# Patient Record
Sex: FEMALE | Race: WHITE
[De-identification: names, ages, dates, MRNs, and addresses within clinical notes are randomized per-mention and may not be internally consistent; named-entity substitution may affect disease eponyms.]

---

## 2017-01-09 ENCOUNTER — HOSPITAL ENCOUNTER (EMERGENCY)
Dept: HOSPITAL 25 - ED | Age: 52
Discharge: HOME | End: 2017-01-09
Payer: COMMERCIAL

## 2017-01-09 VITALS — SYSTOLIC BLOOD PRESSURE: 169 MMHG | DIASTOLIC BLOOD PRESSURE: 78 MMHG

## 2017-01-09 DIAGNOSIS — I10: Primary | ICD-10-CM

## 2017-01-09 DIAGNOSIS — R05: ICD-10-CM

## 2017-01-09 DIAGNOSIS — R07.9: ICD-10-CM

## 2017-01-09 LAB
ALBUMIN SERPL BCG-MCNC: 4.4 G/DL (ref 3.2–5.2)
ALP SERPL-CCNC: 62 U/L (ref 34–104)
ALT SERPL W P-5'-P-CCNC: 19 U/L (ref 7–52)
ANION GAP SERPL CALC-SCNC: 8 MMOL/L (ref 2–11)
AST SERPL-CCNC: 21 U/L (ref 13–39)
BUN SERPL-MCNC: 18 MG/DL (ref 6–24)
BUN/CREAT SERPL: 16.8 (ref 8–20)
CALCIUM SERPL-MCNC: 9.9 MG/DL (ref 8.6–10.3)
CHLORIDE SERPL-SCNC: 103 MMOL/L (ref 101–111)
GLOBULIN SER CALC-MCNC: 2.9 G/DL (ref 2–4)
GLUCOSE SERPL-MCNC: 104 MG/DL (ref 70–100)
HCO3 SERPL-SCNC: 27 MMOL/L (ref 22–32)
HCT VFR BLD AUTO: 44 % (ref 35–47)
HGB BLD-MCNC: 14.9 G/DL (ref 12–16)
MCH RBC QN AUTO: 29 PG (ref 27–31)
MCHC RBC AUTO-ENTMCNC: 34 G/DL (ref 31–36)
MCV RBC AUTO: 86 FL (ref 80–97)
POTASSIUM SERPL-SCNC: 4 MMOL/L (ref 3.5–5)
PROT SERPL-MCNC: 7.3 G/DL (ref 6.4–8.9)
RBC # BLD AUTO: 5.16 10^6/UL (ref 4–5.4)
SODIUM SERPL-SCNC: 138 MMOL/L (ref 133–145)
TROPONIN I SERPL-MCNC: 0 NG/ML (ref ?–0.04)
WBC # BLD AUTO: 9.4 10^3/UL (ref 3.5–10.8)

## 2017-01-09 PROCEDURE — 85379 FIBRIN DEGRADATION QUANT: CPT

## 2017-01-09 PROCEDURE — 36415 COLL VENOUS BLD VENIPUNCTURE: CPT

## 2017-01-09 PROCEDURE — 71020: CPT

## 2017-01-09 PROCEDURE — 85025 COMPLETE CBC W/AUTO DIFF WBC: CPT

## 2017-01-09 PROCEDURE — 80053 COMPREHEN METABOLIC PANEL: CPT

## 2017-01-09 PROCEDURE — 93005 ELECTROCARDIOGRAM TRACING: CPT

## 2017-01-09 PROCEDURE — 99283 EMERGENCY DEPT VISIT LOW MDM: CPT

## 2017-01-09 PROCEDURE — 83605 ASSAY OF LACTIC ACID: CPT

## 2017-01-09 PROCEDURE — 84484 ASSAY OF TROPONIN QUANT: CPT

## 2017-01-09 PROCEDURE — 83880 ASSAY OF NATRIURETIC PEPTIDE: CPT

## 2017-01-09 NOTE — ED
I, Oh,Soohyun, scribed for Blair Juarez MD on 01/09/17 at 1945 .





HPI Chest Pain





- HPI Summary


HPI Summary: 





This 52 y/o female presents to ED for acute chest pressure since 1500 PM. Chest 

pressure is currently alleviated somewhat but still persists. She states that 

she was taking blood pressure at the time of onset, and reports elevated blood 

pressure reading in 120s/100s. Pt denies any SOB and n/v, but reports 

persistent productive cough with yellow sputum since a week ago. Deep breath 

does not make the chest pain worse. Pt was dx with ear infection and just 

finished abx tx. Pt denies any PMHx of HTN. FHx includes cardiac dz to both 

parents, but no blood clotting disorder. Pt is nonsmoker and nondrinker. Last 

stress test was 7 years ago, and was benign. Primary care involves physician at 

[facility] in [place], [place].  








- History of Current Complaint


Chief Complaint: EDChestPainROMI


Time Seen by Provider: 01/09/17 19:36


Hx Obtained From: Patient, Family/Caretaker


Onset/Duration: Started Hours Ago, Atraumatic, Still Present


Timing: Constant


Initial Severity: Moderate


Current Severity: Moderate


Pain Intensity: 4


Pain Scale Used: 0-10 Numeric


Chest Pain Location: Diffuse


Chest Pain Radiates: No


Character: Pressure/Squeezing


Aggravating Factor(s): Nothing


Alleviating Factor(s): Nothing


Associated Signs and Symptoms: Positive: Chest Pain.  Negative: Shortness of 

Breath, Nausea, Vomiting





- Allergy/Home Medications


Allergies/Adverse Reactions: 


 Allergies











Allergy/AdvReac Type Severity Reaction Status Date / Time


 


No Known Allergies Allergy   Verified 01/09/17 19:25














PMH/Surg Hx/FS Hx/Imm Hx


Cardiovascular History: 


   Denies: Hx Coronary Artery Disease, Hx Hypertension


Respiratory History: 


   Denies: Hx Asthma


Infectious Disease History: Yes


Infectious Disease History: 


   Denies: Traveled Outside the [place] in Last 30 Days





- Family History


Known Family History: Positive: Cardiac Disease - both parents, Hypertension - 

father





- Social History


Lives: With Family


Alcohol Use: None


Hx Substance Use: No


Substance Use Type: Reports: None


Hx Tobacco Use: No


Smoking Status (MU): Never Smoked Tobacco





Review of Systems


Negative: Fever


Positive: Chest Pain


Positive: Cough - productive with yellow sputum.  Negative: Shortness Of Breath


Negative: Vomiting, Nausea


Negative: Anxious, Depressed


All Other Systems Reviewed And Are Negative: Yes





Physical Exam





- Summary


Physical Exam Summary: 








The patient is well-nourished in no acute distress and in no acute pain.





The skin is warm and dry and skin color reflects adequate perfusion.





HEENT:  The head is normocephalic and atraumatic. The pupils are equal and 

reactive. The conjunctivae are clear and without drainage.  Nares are patent 

and without drainage.  Mouth reveals moist mucous membranes and the throat is 

without erythema and exudate.  The external ears are intact. The ear canals are 

patent and without drainage. The tympanic membranes are intact.





Neck is supple with full range of motion and non-tender. There are no carotid 

bruits.  There is no neck vein distension.





Respiratory: Chest is non-tender.  Lungs are clear to auscultation and breath 

sounds are symmetrical and equal.





Cardiovascular: Hear is regular rate and rhythm.  There is no murmur or rub 

auscultated.   There is no peripheral edema and pulses are symmetrical and 

equal.





Abdomen: The abdomen is obese, soft, and non-tender.  There are normal bowel 

sounds heard in all four quadrants and there is no organomegaly palpated.





Musculoskeletal: There is no back pain noted.  Extremities are non-tender with 

full range of motion.  There is good capillary refill.  There is no peripheral 

edema or calf tenderness elicited.





Neurological: Patient is alert and oriented to person, place and time.  The 

patient has symmetrical motor strength in all four extremities.  Cranial nerves 

are grossly intact. Deep tendon reflexes are symmetrical and equal in all four 

extremities.





Psychiatric: The patient has an appropriate affect and does not exhibit any 

anxiety or depression. 


Triage Information Reviewed: Yes


Vital Signs On Initial Exam: 


 Initial Vitals











Temp Pulse Resp BP Pulse Ox


 


 98.8 F   95   16   161/100   99 


 


 01/09/17 19:18  01/09/17 19:18  01/09/17 19:18  01/09/17 19:18  01/09/17 19:18











Vital Signs Reviewed: Yes





Diagnostics





- Vital Signs


 Vital Signs











  Temp Pulse Resp BP Pulse Ox


 


 01/09/17 19:18  98.8 F  95  16  161/100  99














- Laboratory


Lab Results: 


 Lab Results











  01/09/17 01/09/17 01/09/17 Range/Units





  19:40 19:40 19:40 


 


WBC  9.4    (3.5-10.8)  10^3/ul


 


RBC  5.16    (4.0-5.4)  10^6/ul


 


Hgb  14.9    (12.0-16.0)  g/dl


 


Hct  44    (35-47)  %


 


MCV  86    (80-97)  fL


 


MCH  29    (27-31)  pg


 


MCHC  34    (31-36)  g/dl


 


RDW  14    (10.5-15)  %


 


Plt Count  275    (150-450)  10^3/ul


 


MPV  9    (7.4-10.4)  um3


 


Neut % (Auto)  55.3    (38-83)  %


 


Lymph % (Auto)  35.0    (25-47)  %


 


Mono % (Auto)  6.8    (1-9)  %


 


Eos % (Auto)  1.8    (0-6)  %


 


Baso % (Auto)  1.1    (0-2)  %


 


Absolute Neuts (auto)  5.2    (1.5-7.7)  10^3/ul


 


Absolute Lymphs (auto)  3.3    (1.0-4.8)  10^3/ul


 


Absolute Monos (auto)  0.6    (0-0.8)  10^3/ul


 


Absolute Eos (auto)  0.2    (0-0.6)  10^3/ul


 


Absolute Basos (auto)  0.1    (0-0.2)  10^3/ul


 


Absolute Nucleated RBC  0    10^3/ul


 


Nucleated RBC %  0.1    


 


D-Dimer, Quantitative   < 200   (Less Than 230)  ng/mL


 


Sodium    138  (133-145)  mmol/L


 


Potassium    4.0  (3.5-5.0)  mmol/L


 


Chloride    103  (101-111)  mmol/L


 


Carbon Dioxide    27  (22-32)  mmol/L


 


Anion Gap    8  (2-11)  mmol/L


 


BUN    18  (6-24)  mg/dL


 


Creatinine    1.07 H  (0.51-0.95)  mg/dL


 


Est GFR ( Amer)    69.3  (>60)  


 


Est GFR (Non-Af Amer)    53.9  (>60)  


 


BUN/Creatinine Ratio    16.8  (8-20)  


 


Glucose    104 H  ()  mg/dL


 


Lactic Acid     (0.5-2.0)  mmol/L


 


Calcium    9.9  (8.6-10.3)  mg/dL


 


Total Bilirubin    0.40  (0.2-1.0)  mg/dL


 


AST    21  (13-39)  U/L


 


ALT    19  (7-52)  U/L


 


Alkaline Phosphatase    62  ()  U/L


 


Troponin I    0.00  (<0.04)  ng/mL


 


B-Natriuretic Peptide    ( - 100) pg/mL


 


Total Protein    7.3  (6.4-8.9)  g/dL


 


Albumin    4.4  (3.2-5.2)  g/dL


 


Globulin    2.9  (2-4)  g/dL


 


Albumin/Globulin Ratio    1.5  (1-3)  














  01/09/17 01/09/17 Range/Units





  19:40 19:40 


 


WBC    (3.5-10.8)  10^3/ul


 


RBC    (4.0-5.4)  10^6/ul


 


Hgb    (12.0-16.0)  g/dl


 


Hct    (35-47)  %


 


MCV    (80-97)  fL


 


MCH    (27-31)  pg


 


MCHC    (31-36)  g/dl


 


RDW    (10.5-15)  %


 


Plt Count    (150-450)  10^3/ul


 


MPV    (7.4-10.4)  um3


 


Neut % (Auto)    (38-83)  %


 


Lymph % (Auto)    (25-47)  %


 


Mono % (Auto)    (1-9)  %


 


Eos % (Auto)    (0-6)  %


 


Baso % (Auto)    (0-2)  %


 


Absolute Neuts (auto)    (1.5-7.7)  10^3/ul


 


Absolute Lymphs (auto)    (1.0-4.8)  10^3/ul


 


Absolute Monos (auto)    (0-0.8)  10^3/ul


 


Absolute Eos (auto)    (0-0.6)  10^3/ul


 


Absolute Basos (auto)    (0-0.2)  10^3/ul


 


Absolute Nucleated RBC    10^3/ul


 


Nucleated RBC %    


 


D-Dimer, Quantitative    (Less Than 230)  ng/mL


 


Sodium    (133-145)  mmol/L


 


Potassium    (3.5-5.0)  mmol/L


 


Chloride    (101-111)  mmol/L


 


Carbon Dioxide    (22-32)  mmol/L


 


Anion Gap    (2-11)  mmol/L


 


BUN    (6-24)  mg/dL


 


Creatinine    (0.51-0.95)  mg/dL


 


Est GFR ( Amer)    (>60)  


 


Est GFR (Non-Af Amer)    (>60)  


 


BUN/Creatinine Ratio    (8-20)  


 


Glucose    ()  mg/dL


 


Lactic Acid  0.8   (0.5-2.0)  mmol/L


 


Calcium    (8.6-10.3)  mg/dL


 


Total Bilirubin    (0.2-1.0)  mg/dL


 


AST    (13-39)  U/L


 


ALT    (7-52)  U/L


 


Alkaline Phosphatase    ()  U/L


 


Troponin I    (<0.04)  ng/mL


 


B-Natriuretic Peptide   49 ( - 100) pg/mL


 


Total Protein    (6.4-8.9)  g/dL


 


Albumin    (3.2-5.2)  g/dL


 


Globulin    (2-4)  g/dL


 


Albumin/Globulin Ratio    (1-3)  











Result Diagrams: 


 01/09/17 19:40





 01/09/17 19:40


Lab Statement: Any lab studies that have been ordered have been reviewed, and 

results considered in the medical decision making process.





- Radiology


  ** CXR


Xray Interpretation: No Acute Changes


Radiology Interpretation Completed By: Radiologist





- EKG


  ** 1907


Cardiac Rate: NL - 78 bpm


EKG Rhythm: Sinus Rhythm





Re-Evaluation





- Re-Evaluation


  ** First Eval


Re-Evaluation Time: 20:51


Comment: ERP in room to update pt on lab and imaging results. Plan of care is 

discussed. Plan of care involving repeat trop is discussed with pt, and she is 

agreeable.





Chest Pain Course/Dx





- Course


Assessment/Plan: This 52 y/o female presents to ED for acute chest pressure 

while taking blood pressure medication at 1500 PM today. Pt also reports that 

her BP reading appeared more elevated than her baseline, 120s/100s. Pt denies 

any PMHx of HTN.  Blood work is wnl, except for slightly elevated creatinine of 

1.09. Trop and D-dimer are negative. Repeat trop pending at this point.





- Chest Pain


Differential Diagnosis/HQI/PQRI: Acute MI, ACS, Lower Respiratory Infection, 

Other: - hypertension, cough, resolved right otalgia,





- Diagnoses


Provider Diagnoses: 


 Chest pain, Hypertension








Discharge





- Discharge Plan


Condition: Stable


Disposition: HOME


Patient Education Materials:  Chest Pain (ED)


Additional Instructions: 


Please be sure to follow up with your primary care provider at [facility] 

in next 2 days as scheduled. 





The documentation as recorded by the Rios meredith Soohyun accurately reflects the 

service I personally performed and the decisions made by me, Blair Juarez MD.

## 2017-01-09 NOTE — RAD
Indication: Cough, chest pain.



2 views of the chest including dual energy PA views demonstrates no mediastinal shift.

Heart is of normal size and configuration. Lung fields are clear.



IMPRESSION: No active cardiopulmonary disease is noted.

## 2019-03-28 ENCOUNTER — HOSPITAL ENCOUNTER (EMERGENCY)
Dept: HOSPITAL 25 - UCCORT | Age: 54
Discharge: HOME | End: 2019-03-28
Payer: COMMERCIAL

## 2019-03-28 VITALS — DIASTOLIC BLOOD PRESSURE: 87 MMHG | SYSTOLIC BLOOD PRESSURE: 149 MMHG

## 2019-03-28 DIAGNOSIS — H10.9: Primary | ICD-10-CM

## 2019-03-28 DIAGNOSIS — Z79.82: ICD-10-CM

## 2019-03-28 PROCEDURE — 99212 OFFICE O/P EST SF 10 MIN: CPT

## 2019-03-28 PROCEDURE — G0463 HOSPITAL OUTPT CLINIC VISIT: HCPCS

## 2019-03-28 NOTE — UC
Eye Complaint HPI





- HPI Summary


HPI Summary: 





Started with R eye redness yesterday.  Discharge is noted but denies vision 

changes or pain w/ eye movement.  of note  had bacterial eye infection 

after working on septic tank.





- History of Current Complaint


Chief Complaint: UCEye


Stated Complaint: RT EYE COMPLAINT


Time Seen by Provider: 03/28/19 17:52


Hx Obtained From: Patient


Onset/Duration: Sudden Onset


Timing: Constant


Severity Initially: Moderate


Severity Currently: Moderate


Pain Intensity: 0


Pain Scale Used: 0-10 Numeric


Location of Injury: Conjunctiva


Aggravating Factor(s): Nothing


Alleviating Factor(s): Nothing





- Allergies/Home Medications


Allergies/Adverse Reactions: 


 Allergies











Allergy/AdvReac Type Severity Reaction Status Date / Time


 


No Known Allergies Allergy   Verified 03/28/19 18:03











Home Medications: 


 Home Medications





Aspirin 81 mg CHEW TAB* [Aspirin Low Dose TAB*] 81 mg PO DAILY 03/28/19 [

History Confirmed 03/28/19]


Fluticasone NASAL SPRAY 50MCG* [Flonase NASAL SPRAY 50MCG*] 2 spray BOTH NARES 

DAILY 03/28/19 [History Confirmed 03/28/19]


Nadolol TAB* [Corgard TAB*] 20 mg PO DAILY 03/28/19 [History Confirmed 03/28/19]


Olmesartan Medoxomil 40 mg PO DAILY 03/28/19 [History Confirmed 03/28/19]











PMH/Surg Hx/FS Hx/Imm Hx


Cardiovascular History: Cardiac Disease





- Surgical History


Surgical History: None





- Family History


Known Family History: Positive: Cardiac Disease - both parents, Hypertension - 

father





- Social History


Alcohol Use: None


Substance Use Type: None


Smoking Status (MU): Never Smoked Tobacco





Review of Systems


All Other Systems Reviewed And Are Negative: Yes


Constitutional: Positive: Negative


Skin: Negative: Rash


Eyes: Positive: Drainage, Eye Redness.  Negative: Blurred Vision, Photophobia





Physical Exam


Triage Information Reviewed: Yes


Appearance: Well-Appearing


Vital Signs: 


 Initial Vital Signs











Temp  98.6 F   03/28/19 17:58


 


Pulse  60   03/28/19 17:58


 


Resp  16   03/28/19 17:58


 


BP  149/87   03/28/19 17:58


 


Pulse Ox  100   03/28/19 17:58











Vital Signs Reviewed: Yes


Eyes: Positive: Conjunctiva Inflamed, Discharge, Other: - PERRLA; no pain w/ 

eye movement.  EOM unremarkable bialt.


ENT: Negative: Muffled voice





Eye Complaint Course/Dx





- Course


Course Of Treatment: 





Acute R eye drainaged and conjunctivitis after exposure to  w/ same.  

Will tx w/ antimicrobial ointment.  NO concerning red flags and vision intact. 

If worsening we discussed seeing opthalm.





- Differential Dx/Diagnosis


Differential Diagnosis/HQI/PQRI: Conjunctivitis, Corneal Abrasion


Provider Diagnosis: 


 Bacterial conjunctivitis








Discharge





- Sign-Out/Discharge


Documenting (check all that apply): Patient Departure


All imaging exams completed and their final reports reviewed: No Studies





- Discharge Plan


Condition: Good


Disposition: HOME


Prescriptions: 


Erythromycin OPTH OINT* [Erythromycin 0.5% OPTH OINT*] 1 applic RIGHT EYE TID 7 

Days #1 ophth.oint


Patient Education Materials:  Conjunctivitis (ED)


Forms:  *Work Release


Referrals: 


Elenita Keene MD [Primary Care Provider] - 


Additional Instructions: 


follow up w/ your pcp if not improving.





- Billing Disposition and Condition


Condition: GOOD


Disposition: Home





- Attestation Statements


Provider Attestation: 





I was available for consult. This patient was seen by the CK. The patient was 

not presented to, seen by, or examined by me. EK